# Patient Record
Sex: FEMALE | Race: WHITE | NOT HISPANIC OR LATINO | URBAN - METROPOLITAN AREA
[De-identification: names, ages, dates, MRNs, and addresses within clinical notes are randomized per-mention and may not be internally consistent; named-entity substitution may affect disease eponyms.]

---

## 2025-04-01 ENCOUNTER — OFFICE VISIT (OUTPATIENT)
Dept: URGENT CARE | Age: 30
End: 2025-04-01
Payer: COMMERCIAL

## 2025-04-01 VITALS
OXYGEN SATURATION: 99 % | TEMPERATURE: 97.7 F | DIASTOLIC BLOOD PRESSURE: 74 MMHG | HEART RATE: 73 BPM | SYSTOLIC BLOOD PRESSURE: 111 MMHG

## 2025-04-01 DIAGNOSIS — M62.830 SPASM OF LEFT TRAPEZIUS MUSCLE: Primary | ICD-10-CM

## 2025-04-01 PROCEDURE — 99203 OFFICE O/P NEW LOW 30 MIN: CPT | Performed by: PHYSICIAN ASSISTANT

## 2025-04-01 RX ORDER — IBUPROFEN 800 MG/1
800 TABLET ORAL 3 TIMES DAILY PRN
Qty: 60 TABLET | Refills: 0 | Status: SHIPPED | OUTPATIENT
Start: 2025-04-01 | End: 2025-05-31

## 2025-04-01 RX ORDER — CYCLOBENZAPRINE HCL 10 MG
5 TABLET ORAL 3 TIMES DAILY PRN
Qty: 15 TABLET | Refills: 0 | Status: SHIPPED | OUTPATIENT
Start: 2025-04-01 | End: 2025-04-11

## 2025-04-01 ASSESSMENT — ENCOUNTER SYMPTOMS
NUMBNESS: 0
WOUND: 0
WEAKNESS: 0
NECK PAIN: 1
CHILLS: 0
FEVER: 0

## 2025-04-01 NOTE — PROGRESS NOTES
Subjective   Patient ID: Digna Valera is a 30 y.o. female. They present today with a chief complaint of Neck Pain.    History of Present Illness  Patient presents for left-sided neck pain.  She states that her basement flooded yesterday and she was moving a lot of items that things is triggered her pain.  Notes that her muscle feels tight and spasm.  She notes that she has decreased range of motion of her neck and finds these movements painful.  She denies any radiation of pain down her arm.  Denies any associated numbness, tingling or acute muscle weakness.  She has tried using a TENS unit, a massage gun, ibuprofen, Tylenol without any lasting relief.  She denies prior history of similar pain.  She states there is no chance she could be pregnant today.          Past Medical History  Allergies as of 2025    (No Known Allergies)       (Not in a hospital admission)       Past Medical History:   Diagnosis Date    Abnormal glucose complicating pregnancy ([facility]) 10/08/2020    Glucose intolerance of pregnancy    Encounter for  screening, unspecified ([facility]) 2020     screening encounter    Encounter for supervision of normal first pregnancy, first trimester ([facility]) 2020    Primigravida in first trimester    Encounter for supervision of normal first pregnancy, second trimester ([facility]) 09/10/2020    Primigravida in second trimester    Encounter for supervision of normal first pregnancy, third trimester ([facility]) 2020    Primigravida in third trimester    Maternal care for other known or suspected poor fetal growth, unspecified trimester, fetus 1 ([facility]) 2020    SGA (small for gestational age), fetal, affecting care of mother, antepartum, unspecified trimester, fetus 1    Other specified health status 2020    Patient denies medical problems    Other specified pregnancy related conditions, unspecified trimester ([facility]) 10/08/2020    Rh negative,  antepartum    Other specified pregnancy related conditions, unspecified trimester ([facility]) 09/10/2020    Heartburn in pregnancy    Pregnancy with inconclusive fetal viability, not applicable or unspecified ([facility]) 05/20/2020    Pregnancy with inconclusive fetal viability       Past Surgical History:   Procedure Laterality Date    OTHER SURGICAL HISTORY  05/20/2020    No history of surgery            Review of Systems  Review of Systems   Constitutional:  Negative for chills and fever.   Musculoskeletal:  Positive for neck pain.   Skin:  Negative for wound.   Neurological:  Negative for weakness and numbness.                                  Objective    Vitals:    04/01/25 0835   BP: 111/74   Pulse: 73   Temp: 36.5 °C (97.7 °F)   SpO2: 99%     No LMP recorded.    Physical Exam  Vitals reviewed.   Constitutional:       Comments: Patient is seated calmly on bedside chair holding her neck in a guarded position in obvious discomfort   Musculoskeletal:      Cervical back: No edema or erythema. Pain with movement and muscular tenderness (Left trapezius tight and tender to palpation) present. No spinous process tenderness. Decreased range of motion.   Skin:     General: Skin is warm.      Capillary Refill: Capillary refill takes less than 2 seconds.   Neurological:      Mental Status: She is alert.      Sensory: Sensation is intact.      Motor: Motor function is intact.         General    Date/Time: 4/1/2025 8:58 AM    Performed by: Karon Fall PA-C  Authorized by: Karon Fall PA-C    Consent:     Consent obtained:  Written    Consent given by:  Patient    Risks discussed:  Bleeding, infection and pain  Universal protocol:     Patient identity confirmed:  Verbally with patient  Indications:     Indications:  Left trapezius spasm  Pre-procedure details:     Procedure prep: Alcohol.  Anesthesia:     Anesthesia method:  Local infiltration    Local anesthetic:  Lidocaine 2% w/o epi  Procedure specific details:       Trigger point injections completed along left trapezius muscle with 27-gauge needle in typical fashion.  Patient tolerated well and noted immediate improvement of her symptoms, range of motion had improved to normal  Post-procedure details:     Procedure completion:  Tolerated      Point of Care Test & Imaging Results from this visit  No results found for this visit on 04/01/25.   Imaging  No results found.    Cardiology, Vascular, and Other Imaging  No other imaging results found for the past 2 days      Diagnostic study results (if any) were reviewed by Karon Fall PA-C.    Assessment/Plan   Allergies, medications, history, and pertinent labs/EKGs/Imaging reviewed by Karon Fall PA-C.     Medical Decision Making  MDM- History and physical exam suggestive of muscle spasm of left trapezius.  No rash, deformity, or other abnormal findings found to indicate other pathology.  Trigger point injections were completed during today's visit, see procedure section for more details.  Patient tolerated well and noted significant improvement in her symptoms.  She was sent a prescription for Flexeril and ibuprofen to help with her pain.  We did discuss risk of sedation with muscle relaxants and advised not to work, drive or combine with alcohol.  Discussed she may continue with heat application, gentle stretching, massage gun or TENS unit as desired.  Follow-up with PCP if no improvement over the next 5 to 7 days, earlier with any acute worsening or new symptoms. Patient verbalized understanding and agrees with plan.      Orders and Diagnoses  Diagnoses and all orders for this visit:  Spasm of left trapezius muscle  -     ibuprofen 800 mg tablet; Take 1 tablet (800 mg) by mouth 3 times a day as needed for mild pain (1 - 3) (pain).  -     cyclobenzaprine (Flexeril) 10 mg tablet; Take 0.5 tablets (5 mg) by mouth 3 times a day as needed for muscle spasms for up to 10 days.      Medical Admin Record      Patient disposition:  Home    Electronically signed by Karon Fall PA-C  8:56 AM

## 2025-04-01 NOTE — LETTER
April 1, 2025     Patient: Digna Valera   YOB: 1995   Date of Visit: 4/1/2025       To Whom It May Concern:    Digna Valera was seen in my clinic on 4/1/2025 at 8:20 am. Please excuse Digna for her absence from work on this day.    If you have any questions or concerns, please don't hesitate to call.         Sincerely,         Karon Fall PA-C